# Patient Record
Sex: MALE | Race: WHITE | NOT HISPANIC OR LATINO | Employment: FULL TIME | URBAN - METROPOLITAN AREA
[De-identification: names, ages, dates, MRNs, and addresses within clinical notes are randomized per-mention and may not be internally consistent; named-entity substitution may affect disease eponyms.]

---

## 2022-07-19 ENCOUNTER — ANCILLARY PROCEDURE (OUTPATIENT)
Dept: GENERAL RADIOLOGY | Facility: CLINIC | Age: 27
End: 2022-07-19
Attending: PHYSICIAN ASSISTANT
Payer: COMMERCIAL

## 2022-07-19 ENCOUNTER — OFFICE VISIT (OUTPATIENT)
Dept: FAMILY MEDICINE | Facility: CLINIC | Age: 27
End: 2022-07-19
Payer: COMMERCIAL

## 2022-07-19 VITALS
SYSTOLIC BLOOD PRESSURE: 118 MMHG | OXYGEN SATURATION: 99 % | TEMPERATURE: 97.6 F | DIASTOLIC BLOOD PRESSURE: 65 MMHG | HEART RATE: 75 BPM | RESPIRATION RATE: 16 BRPM | WEIGHT: 152.31 LBS

## 2022-07-19 DIAGNOSIS — R06.02 SHORTNESS OF BREATH: ICD-10-CM

## 2022-07-19 DIAGNOSIS — F41.9 ANXIETY: Primary | ICD-10-CM

## 2022-07-19 LAB
ALBUMIN UR-MCNC: NEGATIVE MG/DL
APPEARANCE UR: CLEAR
BASOPHILS # BLD AUTO: 0 10E3/UL (ref 0–0.2)
BASOPHILS NFR BLD AUTO: 0 %
BILIRUB UR QL STRIP: NEGATIVE
COLOR UR AUTO: YELLOW
EOSINOPHIL # BLD AUTO: 0 10E3/UL (ref 0–0.7)
EOSINOPHIL NFR BLD AUTO: 0 %
ERYTHROCYTE [DISTWIDTH] IN BLOOD BY AUTOMATED COUNT: 11.9 % (ref 10–15)
GLUCOSE UR STRIP-MCNC: NEGATIVE MG/DL
HCT VFR BLD AUTO: 43.1 % (ref 40–53)
HGB BLD-MCNC: 14.9 G/DL (ref 13.3–17.7)
HGB UR QL STRIP: NEGATIVE
IMM GRANULOCYTES # BLD: 0 10E3/UL
IMM GRANULOCYTES NFR BLD: 0 %
KETONES UR STRIP-MCNC: ABNORMAL MG/DL
LEUKOCYTE ESTERASE UR QL STRIP: NEGATIVE
LYMPHOCYTES # BLD AUTO: 1.1 10E3/UL (ref 0.8–5.3)
LYMPHOCYTES NFR BLD AUTO: 11 %
MCH RBC QN AUTO: 29.4 PG (ref 26.5–33)
MCHC RBC AUTO-ENTMCNC: 34.6 G/DL (ref 31.5–36.5)
MCV RBC AUTO: 85 FL (ref 78–100)
MONOCYTES # BLD AUTO: 0.8 10E3/UL (ref 0–1.3)
MONOCYTES NFR BLD AUTO: 8 %
NEUTROPHILS # BLD AUTO: 8.8 10E3/UL (ref 1.6–8.3)
NEUTROPHILS NFR BLD AUTO: 82 %
NITRATE UR QL: NEGATIVE
PH UR STRIP: 8.5 [PH] (ref 5–8)
PLATELET # BLD AUTO: 267 10E3/UL (ref 150–450)
RBC # BLD AUTO: 5.06 10E6/UL (ref 4.4–5.9)
SP GR UR STRIP: 1.01 (ref 1–1.03)
UROBILINOGEN UR STRIP-ACNC: 0.2 E.U./DL
WBC # BLD AUTO: 10.8 10E3/UL (ref 4–11)

## 2022-07-19 PROCEDURE — 36415 COLL VENOUS BLD VENIPUNCTURE: CPT | Performed by: PHYSICIAN ASSISTANT

## 2022-07-19 PROCEDURE — 85025 COMPLETE CBC W/AUTO DIFF WBC: CPT | Performed by: PHYSICIAN ASSISTANT

## 2022-07-19 PROCEDURE — 81003 URINALYSIS AUTO W/O SCOPE: CPT | Performed by: PHYSICIAN ASSISTANT

## 2022-07-19 PROCEDURE — 71046 X-RAY EXAM CHEST 2 VIEWS: CPT | Mod: TC | Performed by: RADIOLOGY

## 2022-07-19 PROCEDURE — 99203 OFFICE O/P NEW LOW 30 MIN: CPT | Performed by: PHYSICIAN ASSISTANT

## 2022-07-19 RX ORDER — HYDROXYZINE PAMOATE 50 MG/1
100 CAPSULE ORAL
Qty: 28 CAPSULE | Refills: 0 | Status: SHIPPED | OUTPATIENT
Start: 2022-07-19 | End: 2022-08-02

## 2022-07-20 NOTE — PROGRESS NOTES
Patient presents with:  Shortness of Breath: Has had SOB for several days  has tightening in abd and tingling in shoulders tested negative for COVID today      Clinical Decision Making:  Advised patient that we did do a work-up looking for CBC for anemia, checking his urine and also doing a chest x-ray.  The examination and laboratory findings were within normal limits and had benign exam.  Patient is reassured.  He may follow-up with his primary care provider for further work-up reviewing TSH or further causes.  TSH was not screened today.  Patient be given Vistaril to help with sleep at nighttime.  He is cautioned regarding impairment and sedation and not to use during the daytime or when he may be impaired and hurt himself or someone else.  Questions were answered to his satisfaction before discharge          ICD-10-CM    1. Anxiety  F41.9 hydrOXYzine (VISTARIL) 50 MG capsule   2. Shortness of breath  R06.02 CBC with platelets and differential     UA macro with reflex to Microscopic and Culture - Clinc Collect     XR Chest 2 Views       Patient Instructions   Take the medication at nighttime to help with sleep and your symptoms.  Do not use during the daytime when he have to drive or do other attentional activities where you may harm yourself or someone else.    Follow-up with your primary care provider for reevaluation and treatment of your symptoms.            HPI:  Ren Prieto is a 26 year old male who presents today shortness of breath for several days and has tightening in the abdomen and tingling in her shoulders.  Patient tested at home for COVID and was negative.  He believes the shortness of breath has been intermittent over the last 1 week.  He does not recall anything that makes it better or worse.  Patient does not have respiratory symptoms specifically to include cough nausea vomiting diarrhea skin rash abdominal pain urinary symptoms heart palpitations lightheadedness syncope or presyncope.   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  Patient shares that he has been trying to hydrate and had 420 ounce bottles of water today which would be at least 80 ounces total intake and his urine has been clear.     History obtained from chart review and the patient.    Problem List:  There are no relevant problems documented for this patient.      No past medical history on file.    Social History     Tobacco Use     Smoking status: Never Smoker     Smokeless tobacco: Never Used   Substance Use Topics     Alcohol use: Not on file       Review of Systems  As above in HPI otherwise negative.    Vitals:    07/19/22 1928   BP: 118/65   Pulse: 75   Resp: 16   Temp: 97.6  F (36.4  C)   TempSrc: Oral   SpO2: 99%   Weight: 69.1 kg (152 lb 5 oz)       General: Patient is resting comfortably no acute distress is afebrile  HEENT: Head is normocephalic atraumatic   eyes are PERRL EOMI sclera anicteric   TMs are clear bilaterally  Throat is clear and oral mucous membranes are  No cervical lymphadenopathy present  LUNGS: Clear to auscultation bilaterally  HEART: Regular rate and rhythm  Abdomen: Nontender nondistended no rebound or guarding no masses  Skin: Without rash non-diaphoretic capillary refills less than 2 seconds.  Neuro: Radial nerves II through XII grossly intact no focal neurologic deficits.  Finger-to-nose heel-to-shin testing and strength is 5 out of 5 and equal bilaterally.  Romberg is negative    Physical Exam      Labs:  Results for orders placed or performed in visit on 07/19/22   XR Chest 2 Views     Status: None    Narrative    EXAM: XR CHEST 2 VW  LOCATION: [facility]  DATE/TIME: 7/19/2022 7:54 PM    INDICATION: Shortness of breath.  COMPARISON: None.      Impression    IMPRESSION: Negative chest. Lungs clear.   UA macro with reflex to Microscopic and Culture - Clinc Collect     Status: Abnormal    Specimen: Urine, Clean Catch    Result Value Ref Range    Color Urine Yellow Colorless, Straw, Light Yellow, Yellow    Appearance Urine Clear Clear    Glucose Urine Negative Negative mg/dL    Bilirubin Urine Negative Negative    Ketones Urine Trace (A) Negative mg/dL    Specific Gravity Urine 1.015 1.005 - 1.030    Blood Urine Negative Negative    pH Urine 8.5 (H) 5.0 - 8.0    Protein Albumin Urine Negative Negative mg/dL    Urobilinogen Urine 0.2 0.2, 1.0 E.U./dL    Nitrite Urine Negative Negative    Leukocyte Esterase Urine Negative Negative    Narrative    Microscopic not indicated   CBC with platelets and differential     Status: Abnormal   Result Value Ref Range    WBC Count 10.8 4.0 - 11.0 10e3/uL    RBC Count 5.06 4.40 - 5.90 10e6/uL    Hemoglobin 14.9 13.3 - 17.7 g/dL    Hematocrit 43.1 40.0 - 53.0 %    MCV 85 78 - 100 fL    MCH 29.4 26.5 - 33.0 pg    MCHC 34.6 31.5 - 36.5 g/dL    RDW 11.9 10.0 - 15.0 %    Platelet Count 267 150 - 450 10e3/uL    % Neutrophils 82 %    % Lymphocytes 11 %    % Monocytes 8 %    % Eosinophils 0 %    % Basophils 0 %    % Immature Granulocytes 0 %    Absolute Neutrophils 8.8 (H) 1.6 - 8.3 10e3/uL    Absolute Lymphocytes 1.1 0.8 - 5.3 10e3/uL    Absolute Monocytes 0.8 0.0 - 1.3 10e3/uL    Absolute Eosinophils 0.0 0.0 - 0.7 10e3/uL    Absolute Basophils 0.0 0.0 - 0.2 10e3/uL    Absolute Immature Granulocytes 0.0 <=0.4 10e3/uL   CBC with platelets and differential     Status: Abnormal    Narrative    The following orders were created for panel order CBC with platelets and differential.  Procedure                               Abnormality         Status                     ---------                               -----------         ------                     CBC with platelets and d...[[id number]]  Abnormal            Final result                 Please view results for these tests on the individual orders.     At the end of the encounter, I discussed results, diagnosis, medications. Discussed red flags for  immediate return to clinic/ER, as well as indications for follow up if no improvement. Patient understood and agreed to plan. Patient was stable for discharge.

## 2022-07-20 NOTE — PATIENT INSTRUCTIONS
Take the medication at nighttime to help with sleep and your symptoms.  Do not use during the daytime when he have to drive or do other attentional activities where you may harm yourself or someone else.    Follow-up with your primary care provider for reevaluation and treatment of your symptoms.

## 2022-08-02 ENCOUNTER — OFFICE VISIT (OUTPATIENT)
Dept: INTERNAL MEDICINE | Facility: CLINIC | Age: 27
End: 2022-08-02

## 2022-08-02 VITALS
OXYGEN SATURATION: 99 % | HEART RATE: 85 BPM | BODY MASS INDEX: 20.67 KG/M2 | DIASTOLIC BLOOD PRESSURE: 64 MMHG | SYSTOLIC BLOOD PRESSURE: 122 MMHG | WEIGHT: 156 LBS | HEIGHT: 73 IN

## 2022-08-02 DIAGNOSIS — F41.9 ANXIETY: ICD-10-CM

## 2022-08-02 PROCEDURE — 99213 OFFICE O/P EST LOW 20 MIN: CPT | Performed by: NURSE PRACTITIONER

## 2022-08-02 PROCEDURE — 96127 BRIEF EMOTIONAL/BEHAV ASSMT: CPT | Performed by: NURSE PRACTITIONER

## 2022-08-02 RX ORDER — HYDROXYZINE PAMOATE 50 MG/1
100 CAPSULE ORAL
Qty: 60 CAPSULE | Refills: 2 | Status: SHIPPED | OUTPATIENT
Start: 2022-08-02 | End: 2024-02-27

## 2022-08-02 ASSESSMENT — PATIENT HEALTH QUESTIONNAIRE - PHQ9
SUM OF ALL RESPONSES TO PHQ QUESTIONS 1-9: 8
5. POOR APPETITE OR OVEREATING: MORE THAN HALF THE DAYS

## 2022-08-02 ASSESSMENT — ANXIETY QUESTIONNAIRES
GAD7 TOTAL SCORE: 10
8. IF YOU CHECKED OFF ANY PROBLEMS, HOW DIFFICULT HAVE THESE MADE IT FOR YOU TO DO YOUR WORK, TAKE CARE OF THINGS AT HOME, OR GET ALONG WITH OTHER PEOPLE?: SOMEWHAT DIFFICULT
2. NOT BEING ABLE TO STOP OR CONTROL WORRYING: SEVERAL DAYS
3. WORRYING TOO MUCH ABOUT DIFFERENT THINGS: MORE THAN HALF THE DAYS
6. BECOMING EASILY ANNOYED OR IRRITABLE: NEARLY EVERY DAY
GAD7 TOTAL SCORE: 11
5. BEING SO RESTLESS THAT IT IS HARD TO SIT STILL: SEVERAL DAYS
2. NOT BEING ABLE TO STOP OR CONTROL WORRYING: SEVERAL DAYS
5. BEING SO RESTLESS THAT IT IS HARD TO SIT STILL: SEVERAL DAYS
GAD7 TOTAL SCORE: 10
IF YOU CHECKED OFF ANY PROBLEMS ON THIS QUESTIONNAIRE, HOW DIFFICULT HAVE THESE PROBLEMS MADE IT FOR YOU TO DO YOUR WORK, TAKE CARE OF THINGS AT HOME, OR GET ALONG WITH OTHER PEOPLE: SOMEWHAT DIFFICULT
1. FEELING NERVOUS, ANXIOUS, OR ON EDGE: SEVERAL DAYS
GAD7 TOTAL SCORE: 10
7. FEELING AFRAID AS IF SOMETHING AWFUL MIGHT HAPPEN: SEVERAL DAYS
3. WORRYING TOO MUCH ABOUT DIFFERENT THINGS: SEVERAL DAYS
7. FEELING AFRAID AS IF SOMETHING AWFUL MIGHT HAPPEN: SEVERAL DAYS
IF YOU CHECKED OFF ANY PROBLEMS ON THIS QUESTIONNAIRE, HOW DIFFICULT HAVE THESE PROBLEMS MADE IT FOR YOU TO DO YOUR WORK, TAKE CARE OF THINGS AT HOME, OR GET ALONG WITH OTHER PEOPLE: SOMEWHAT DIFFICULT
7. FEELING AFRAID AS IF SOMETHING AWFUL MIGHT HAPPEN: SEVERAL DAYS
6. BECOMING EASILY ANNOYED OR IRRITABLE: NEARLY EVERY DAY
4. TROUBLE RELAXING: MORE THAN HALF THE DAYS
1. FEELING NERVOUS, ANXIOUS, OR ON EDGE: SEVERAL DAYS

## 2022-08-02 ASSESSMENT — ENCOUNTER SYMPTOMS: NERVOUS/ANXIOUS: 1

## 2022-08-02 NOTE — PROGRESS NOTES
"  Assessment & Plan     Anxiety  Seeing great results from the hydroxyzine every evening.  Refills provided today.  He is going to stop by the  and schedule a \"establish care\" appointment with new PCP before he leaves today    - hydrOXYzine (VISTARIL) 50 MG capsule; Take 2 capsules (100 mg) by mouth nightly as needed for itching or anxiety      Heriberto Ochoa, [facility]    Michael Mcclure is a 26 year old, presenting for the following health issues:  Anxiety      Anxiety    History of Present Illness       Mental Health Follow-up:  Patient presents to follow-up on Anxiety.    Patient's anxiety since last visit has been:  Better  The patient is having other symptoms associated with anxiety.  Any significant life events: job concerns  Patient is feeling anxious or having panic attacks.  Patient has no concerns about alcohol or drug use.    He eats 0-1 servings of fruits and vegetables daily.He consumes 2 sweetened beverage(s) daily.He exercises with enough effort to increase his heart rate 30 to 60 minutes per day.  He exercises with enough effort to increase his heart rate 4 days per week.   He is taking medications regularly.  Today's ANGELICA-7 Score: 10       Review of Systems   Psychiatric/Behavioral: The patient is nervous/anxious.       Constitutional, HEENT, cardiovascular, pulmonary, gi and gu systems are negative, except as otherwise noted.      Objective    /64 (BP Location: Right arm, Patient Position: Sitting, Cuff Size: Adult Regular)   Pulse 85   Ht 1.854 m (6' 1\")   Wt 70.8 kg (156 lb)   SpO2 99%   BMI 20.58 kg/m    Body mass index is 20.58 kg/m .  Physical Exam     Healthy appearing and in no acute distress      .  ..  "

## 2022-09-23 ENCOUNTER — OFFICE VISIT (OUTPATIENT)
Dept: FAMILY MEDICINE | Facility: CLINIC | Age: 27
End: 2022-09-23
Payer: COMMERCIAL

## 2022-09-23 VITALS
DIASTOLIC BLOOD PRESSURE: 62 MMHG | SYSTOLIC BLOOD PRESSURE: 120 MMHG | HEIGHT: 74 IN | WEIGHT: 185.6 LBS | BODY MASS INDEX: 23.82 KG/M2 | HEART RATE: 84 BPM

## 2022-09-23 DIAGNOSIS — F41.9 ANXIETY: Primary | ICD-10-CM

## 2022-09-23 DIAGNOSIS — G40.909 SEIZURE DISORDER (H): ICD-10-CM

## 2022-09-23 PROCEDURE — 99214 OFFICE O/P EST MOD 30 MIN: CPT | Mod: 25 | Performed by: NURSE PRACTITIONER

## 2022-09-23 PROCEDURE — 96127 BRIEF EMOTIONAL/BEHAV ASSMT: CPT | Performed by: NURSE PRACTITIONER

## 2022-09-23 PROCEDURE — 90471 IMMUNIZATION ADMIN: CPT | Performed by: NURSE PRACTITIONER

## 2022-09-23 PROCEDURE — 90715 TDAP VACCINE 7 YRS/> IM: CPT | Performed by: NURSE PRACTITIONER

## 2022-09-23 RX ORDER — BUSPIRONE HYDROCHLORIDE 7.5 MG/1
7.5 TABLET ORAL 3 TIMES DAILY
Qty: 60 TABLET | Refills: 1 | Status: SHIPPED | OUTPATIENT
Start: 2022-09-23 | End: 2023-12-11

## 2022-09-23 RX ORDER — VENLAFAXINE HYDROCHLORIDE 37.5 MG/1
37.5 CAPSULE, EXTENDED RELEASE ORAL DAILY
Qty: 30 CAPSULE | Refills: 1 | Status: SHIPPED | OUTPATIENT
Start: 2022-09-23 | End: 2022-09-23

## 2022-09-23 ASSESSMENT — ANXIETY QUESTIONNAIRES
7. FEELING AFRAID AS IF SOMETHING AWFUL MIGHT HAPPEN: NOT AT ALL
4. TROUBLE RELAXING: NOT AT ALL
GAD7 TOTAL SCORE: 3
3. WORRYING TOO MUCH ABOUT DIFFERENT THINGS: SEVERAL DAYS
GAD7 TOTAL SCORE: 3
8. IF YOU CHECKED OFF ANY PROBLEMS, HOW DIFFICULT HAVE THESE MADE IT FOR YOU TO DO YOUR WORK, TAKE CARE OF THINGS AT HOME, OR GET ALONG WITH OTHER PEOPLE?: NOT DIFFICULT AT ALL
5. BEING SO RESTLESS THAT IT IS HARD TO SIT STILL: NOT AT ALL
1. FEELING NERVOUS, ANXIOUS, OR ON EDGE: SEVERAL DAYS
GAD7 TOTAL SCORE: 3
6. BECOMING EASILY ANNOYED OR IRRITABLE: SEVERAL DAYS
7. FEELING AFRAID AS IF SOMETHING AWFUL MIGHT HAPPEN: NOT AT ALL
IF YOU CHECKED OFF ANY PROBLEMS ON THIS QUESTIONNAIRE, HOW DIFFICULT HAVE THESE PROBLEMS MADE IT FOR YOU TO DO YOUR WORK, TAKE CARE OF THINGS AT HOME, OR GET ALONG WITH OTHER PEOPLE: NOT DIFFICULT AT ALL
2. NOT BEING ABLE TO STOP OR CONTROL WORRYING: NOT AT ALL

## 2022-09-23 ASSESSMENT — ENCOUNTER SYMPTOMS
NERVOUS/ANXIOUS: 1
SLEEP DISTURBANCE: 0

## 2022-09-23 NOTE — PROGRESS NOTES
Assessment & Plan     Seizure disorder  - in past, last sx age 18.     Anxiety  May try Buspar. Side effects and how to take the med discussed.   - busPIRone (BUSPAR) 7.5 MG tablet; Take 1 tablet (7.5 mg) by mouth 3 times daily       MEDICATIONS:        - Trial of Buspar.        - Take Atarax only PRN and try to only take 1/2-1 tablet per episode.    Return in about 2 months (around 11/23/2022) for Follow up, with me, using a video visit.    CHAPIN Riojas [facility]    Michael Mcclure is a 27 year old presenting for the following health issues:  Establish Care    -The patient is present today to establish care.  He is present with his girlfriend.  He stated that his anxiety is slightly better and his sleep has been better with taking the hydroxyzine at night.  He stated that he has a lot of stress from work.  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  He tends to work 12-hour shifts, about 60 hours/week.  His girlfriend is noticing more irritability at times during the day.  The patient denied any depression or suicidal thoughts.  He is not having any more panic attacks.  He stated prior to taking hydroxyzine he would noticed that he would toss and turn at night, but would get an average of 6 to 8 hours of sleep.     History of Present Illness       Mental Health Follow-up:  Patient presents to follow-up on Anxiety.    Patient's anxiety since last visit has been:  Better  The patient is not having other symptoms associated with anxiety.  Any significant life events: No  Patient is not feeling anxious or having panic attacks.  Patient has no concerns about alcohol or drug use.    He eats 0-1 servings of fruits and vegetables daily.He consumes 2 sweetened beverage(s) daily.He exercises with enough effort to increase his heart rate 30 to 60 minutes per day.  He exercises with enough effort to increase his heart rate 3 or less days per week. He is missing 1  "dose(s) of medications per week.  Today's ANGELICA-7 Score: 3             Review of Systems   Psychiatric/Behavioral: Negative for self-injury, sleep disturbance and suicidal ideas. The patient is nervous/anxious.             Objective    /62 (BP Location: Right arm, Patient Position: Sitting, Cuff Size: Adult Regular)   Pulse 84   Ht 1.867 m (6' 1.5\")   Wt 84.2 kg (185 lb 9.6 oz)   BMI 24.15 kg/m    Body mass index is 24.15 kg/m .  Physical Exam  Vitals and nursing note reviewed.   Constitutional:       Appearance: Normal appearance. He is normal weight.   Pulmonary:      Effort: Pulmonary effort is normal.   Neurological:      Mental Status: He is alert.   Psychiatric:         Mood and Affect: Mood normal.         Behavior: Behavior normal.         Thought Content: Thought content normal.         Judgment: Judgment normal.                            "

## 2023-12-01 ENCOUNTER — TRANSFERRED RECORDS (OUTPATIENT)
Dept: HEALTH INFORMATION MANAGEMENT | Facility: CLINIC | Age: 28
End: 2023-12-01
Payer: COMMERCIAL

## 2023-12-07 ENCOUNTER — TRANSCRIBE ORDERS (OUTPATIENT)
Dept: OTHER | Age: 28
End: 2023-12-07

## 2023-12-07 DIAGNOSIS — R06.2 WHEEZING: ICD-10-CM

## 2023-12-07 DIAGNOSIS — R13.12 OROPHARYNGEAL DYSPHAGIA: ICD-10-CM

## 2023-12-07 DIAGNOSIS — R13.19 ESOPHAGEAL DYSPHAGIA: Primary | ICD-10-CM

## 2023-12-11 ENCOUNTER — VIRTUAL VISIT (OUTPATIENT)
Dept: FAMILY MEDICINE | Facility: CLINIC | Age: 28
End: 2023-12-11
Payer: COMMERCIAL

## 2023-12-11 DIAGNOSIS — F41.9 ANXIETY: ICD-10-CM

## 2023-12-11 PROCEDURE — 99213 OFFICE O/P EST LOW 20 MIN: CPT | Mod: VID | Performed by: NURSE PRACTITIONER

## 2023-12-11 RX ORDER — OMEPRAZOLE 40 MG/1
40 CAPSULE, DELAYED RELEASE ORAL DAILY
COMMUNITY
Start: 2023-10-10 | End: 2023-12-26

## 2023-12-11 RX ORDER — FAMOTIDINE 20 MG/1
20 TABLET, FILM COATED ORAL
COMMUNITY
Start: 2023-10-04 | End: 2024-02-27

## 2023-12-11 RX ORDER — BUSPIRONE HYDROCHLORIDE 7.5 MG/1
7.5 TABLET ORAL 2 TIMES DAILY
Qty: 60 TABLET | Refills: 11 | Status: SHIPPED | OUTPATIENT
Start: 2023-12-11

## 2023-12-11 NOTE — PROGRESS NOTES
Ren is a 28 year old who is being evaluated via a billable video visit.      How would you like to obtain your AVS? MyChart  If the video visit is dropped, the invitation should be resent by: Text to cell phone: [phone]  Will anyone else be joining your video visit? No          Assessment & Plan     Anxiety    - busPIRone (BUSPAR) 7.5 MG tablet  Dispense: 60 tablet; Refill: 11    -His anxiety is stable with BuSpar.  He can continue this medication.  He does not need a refill of his hydroxyzine.   -He will continue to follow-up with specialist as needed.                  CHAPIN Riojas [facility]   Ren is a 28 year old, presenting for the following health issues:  Recheck Medication         No data to display              -He has been seen by a GI specialist.  He has plans to get a CT scan of the lungs tomorrow because he is noticing a wheeze, and will follow-up with pulmonology after that.  His anxiety has been for the most part under control, but has started using his BuSpar more often since he has been noticing other symptoms with his stomach and wheezing.  He finds that the BuSpar is effective, and he takes it twice a day.  He denied any suicidal or homicidal thoughts or plans.  He still works at the same job, but has been getting more weekends off, and finding more stable staff.  This has helped him with the stress level.  He denied any recent panic attacks or hospitalizations for panic.     HPI             Review of Systems         Objective           Vitals:  No vitals were obtained today due to virtual visit.    Physical Exam   GENERAL: Healthy, alert and no distress  EYES: Eyes grossly normal to inspection.  No discharge or erythema, or obvious scleral/conjunctival abnormalities.  RESP: No audible wheeze, cough, or visible cyanosis.  No visible retractions or increased work of breathing.    SKIN: Visible skin clear. No significant  rash, abnormal pigmentation or lesions.  NEURO: Cranial nerves grossly intact.  Mentation and speech appropriate for age.  PSYCH: Mentation appears normal, affect normal/bright, judgement and insight intact, normal speech and appearance well-groomed.    Office Visit on 07/19/2022   Component Date Value Ref Range Status    Color Urine 07/19/2022 Yellow  Colorless, Straw, Light Yellow, Yellow Final    Appearance Urine 07/19/2022 Clear  Clear Final    Glucose Urine 07/19/2022 Negative  Negative mg/dL Final    Bilirubin Urine 07/19/2022 Negative  Negative Final    Ketones Urine 07/19/2022 Trace (A)  Negative mg/dL Final    Specific Gravity Urine 07/19/2022 1.015  1.005 - 1.030 Final    Blood Urine 07/19/2022 Negative  Negative Final    pH Urine 07/19/2022 8.5 (H)  5.0 - 8.0 Final    Protein Albumin Urine 07/19/2022 Negative  Negative mg/dL Final    Urobilinogen Urine 07/19/2022 0.2  0.2, 1.0 E.U./dL Final    Nitrite Urine 07/19/2022 Negative  Negative Final    Leukocyte Esterase Urine 07/19/2022 Negative  Negative Final    WBC Count 07/19/2022 10.8  4.0 - 11.0 10e3/uL Final    RBC Count 07/19/2022 5.06  4.40 - 5.90 10e6/uL Final    Hemoglobin 07/19/2022 14.9  13.3 - 17.7 g/dL Final    Hematocrit 07/19/2022 43.1  40.0 - 53.0 % Final    MCV 07/19/2022 85  78 - 100 fL Final    MCH 07/19/2022 29.4  26.5 - 33.0 pg Final    MCHC 07/19/2022 34.6  31.5 - 36.5 g/dL Final    RDW 07/19/2022 11.9  10.0 - 15.0 % Final    Platelet Count 07/19/2022 267  150 - 450 10e3/uL Final    % Neutrophils 07/19/2022 82  % Final    % Lymphocytes 07/19/2022 11  % Final    % Monocytes 07/19/2022 8  % Final    % Eosinophils 07/19/2022 0  % Final    % Basophils 07/19/2022 0  % Final    % Immature Granulocytes 07/19/2022 0  % Final    Absolute Neutrophils 07/19/2022 8.8 (H)  1.6 - 8.3 10e3/uL Final    Absolute Lymphocytes 07/19/2022 1.1  0.8 - 5.3 10e3/uL Final    Absolute Monocytes 07/19/2022 0.8  0.0 - 1.3 10e3/uL Final    Absolute Eosinophils  07/19/2022 0.0  0.0 - 0.7 10e3/uL Final    Absolute Basophils 07/19/2022 0.0  0.0 - 0.2 10e3/uL Final    Absolute Immature Granulocytes 07/19/2022 0.0  <=0.4 10e3/uL Final               Video-Visit Details    Type of service:  Video Visit     Originating Location (pt. Location): Home    Distant Location (provider location):  On-site  Platform used for Video Visit: Torri

## 2023-12-12 ENCOUNTER — TRANSFERRED RECORDS (OUTPATIENT)
Dept: HEALTH INFORMATION MANAGEMENT | Facility: CLINIC | Age: 28
End: 2023-12-12
Payer: COMMERCIAL

## 2023-12-12 DIAGNOSIS — R06.2 WHEEZING: Primary | ICD-10-CM

## 2023-12-14 ENCOUNTER — TRANSFERRED RECORDS (OUTPATIENT)
Dept: HEALTH INFORMATION MANAGEMENT | Facility: CLINIC | Age: 28
End: 2023-12-14
Payer: COMMERCIAL

## 2023-12-21 ENCOUNTER — OFFICE VISIT (OUTPATIENT)
Dept: PULMONOLOGY | Facility: CLINIC | Age: 28
End: 2023-12-21
Payer: COMMERCIAL

## 2023-12-21 DIAGNOSIS — R06.2 WHEEZING: ICD-10-CM

## 2023-12-21 LAB — HGB BLD-MCNC: 15.2 G/DL

## 2023-12-21 PROCEDURE — 94060 EVALUATION OF WHEEZING: CPT | Performed by: NURSE PRACTITIONER

## 2023-12-21 PROCEDURE — 94726 PLETHYSMOGRAPHY LUNG VOLUMES: CPT | Performed by: NURSE PRACTITIONER

## 2023-12-21 PROCEDURE — 85018 HEMOGLOBIN: CPT | Mod: QW | Performed by: NURSE PRACTITIONER

## 2023-12-21 PROCEDURE — 94729 DIFFUSING CAPACITY: CPT | Performed by: NURSE PRACTITIONER

## 2023-12-25 ENCOUNTER — MYC MEDICAL ADVICE (OUTPATIENT)
Dept: FAMILY MEDICINE | Facility: CLINIC | Age: 28
End: 2023-12-25
Payer: COMMERCIAL

## 2023-12-25 DIAGNOSIS — R06.02 SHORTNESS OF BREATH: Primary | ICD-10-CM

## 2023-12-26 RX ORDER — MAGNESIUM HYDROXIDE/ALUMINUM HYDROXICE/SIMETHICONE 120; 1200; 1200 MG/30ML; MG/30ML; MG/30ML
15-30 SUSPENSION ORAL
COMMUNITY
Start: 2023-10-03 | End: 2024-04-05

## 2023-12-26 RX ORDER — OMEPRAZOLE 40 MG/1
40 CAPSULE, DELAYED RELEASE ORAL DAILY
Qty: 90 CAPSULE | Refills: 0 | Status: SHIPPED | OUTPATIENT
Start: 2023-12-26

## 2023-12-28 LAB
DLCOCOR-%PRED-PRE: 104 %
DLCOCOR-PRE: 37.44 ML/MIN/MMHG
DLCOUNC-%PRED-PRE: 106 %
DLCOUNC-PRE: 38.06 ML/MIN/MMHG
DLCOUNC-PRED: 35.78 ML/MIN/MMHG
ERV-%PRED-PRE: 83 %
ERV-PRE: 1.65 L
ERV-PRED: 1.97 L
EXPTIME-PRE: 4.52 SEC
FEF2575-%PRED-POST: 66 %
FEF2575-%PRED-PRE: 52 %
FEF2575-POST: 3.2 L/SEC
FEF2575-PRE: 2.53 L/SEC
FEF2575-PRED: 4.8 L/SEC
FEFMAX-%PRED-PRE: 58 %
FEFMAX-PRE: 6.33 L/SEC
FEFMAX-PRED: 10.79 L/SEC
FEV1-%PRED-PRE: 81 %
FEV1-PRE: 3.75 L
FEV1FEV6-PRE: 61 %
FEV1FEV6-PRED: 84 %
FEV1FVC-PRE: 61 %
FEV1FVC-PRED: 84 %
FEV1SVC-PRE: 66 %
FEV1SVC-PRED: 81 %
FIFMAX-PRE: 5.97 L/SEC
FRCPLETH-%PRED-PRE: 130 %
FRCPLETH-PRE: 4.52 L
FRCPLETH-PRED: 3.46 L
FVC-%PRED-PRE: 111 %
FVC-PRE: 6.15 L
FVC-PRED: 5.53 L
IC-%PRED-PRE: 102 %
IC-PRE: 4.04 L
IC-PRED: 3.93 L
RVPLETH-%PRED-PRE: 163 %
RVPLETH-PRE: 2.88 L
RVPLETH-PRED: 1.76 L
TLCPLETH-%PRED-PRE: 112 %
TLCPLETH-PRE: 8.57 L
TLCPLETH-PRED: 7.63 L
VA-%PRED-PRE: 111 %
VA-PRE: 7.95 L
VC-%PRED-PRE: 99 %
VC-PRE: 5.69 L
VC-PRED: 5.69 L

## 2023-12-31 ENCOUNTER — HEALTH MAINTENANCE LETTER (OUTPATIENT)
Age: 28
End: 2023-12-31

## 2024-01-14 ENCOUNTER — MYC MEDICAL ADVICE (OUTPATIENT)
Dept: FAMILY MEDICINE | Facility: CLINIC | Age: 29
End: 2024-01-14
Payer: COMMERCIAL

## 2024-01-19 ENCOUNTER — OFFICE VISIT (OUTPATIENT)
Dept: PULMONOLOGY | Facility: CLINIC | Age: 29
End: 2024-01-19
Attending: INTERNAL MEDICINE
Payer: COMMERCIAL

## 2024-01-19 VITALS
WEIGHT: 151.8 LBS | OXYGEN SATURATION: 100 % | DIASTOLIC BLOOD PRESSURE: 72 MMHG | BODY MASS INDEX: 19.76 KG/M2 | HEART RATE: 66 BPM | SYSTOLIC BLOOD PRESSURE: 129 MMHG

## 2024-01-19 DIAGNOSIS — R13.12 OROPHARYNGEAL DYSPHAGIA: ICD-10-CM

## 2024-01-19 DIAGNOSIS — R13.19 ESOPHAGEAL DYSPHAGIA: ICD-10-CM

## 2024-01-19 DIAGNOSIS — R06.2 WHEEZING: ICD-10-CM

## 2024-01-19 DIAGNOSIS — J45.909 REACTIVE AIRWAY DISEASE WITHOUT COMPLICATION, UNSPECIFIED ASTHMA SEVERITY, UNSPECIFIED WHETHER PERSISTENT: Primary | ICD-10-CM

## 2024-01-19 PROCEDURE — 90686 IIV4 VACC NO PRSV 0.5 ML IM: CPT | Performed by: INTERNAL MEDICINE

## 2024-01-19 PROCEDURE — 90471 IMMUNIZATION ADMIN: CPT | Performed by: INTERNAL MEDICINE

## 2024-01-19 PROCEDURE — 99204 OFFICE O/P NEW MOD 45 MIN: CPT | Mod: 25 | Performed by: INTERNAL MEDICINE

## 2024-01-19 RX ORDER — ALBUTEROL SULFATE 90 UG/1
2 AEROSOL, METERED RESPIRATORY (INHALATION) EVERY 6 HOURS PRN
Qty: 18 G | Refills: 11 | Status: SHIPPED | OUTPATIENT
Start: 2024-01-19

## 2024-01-19 NOTE — PROGRESS NOTES
Pulmonary Clinic Outpatient Consultation    Assessment and Plan:   28M with a history of dysphagia, anxiety, presents for wheezing evaluation. He's had intermittent wheezing associated with occasional cough and shortness of breath for several months. The symptoms are relieved by albuterol use. PFTs showed borderline FEV1 with positive response to bronchodilator, reduction in mid flow rates with positive bronchodilator response, normal TLC and normal Dlco. These are consistent with small airways disease and probable asthma. His seems to be mild with only as needed albuterol use. Discussed pathophysiology of asthma and treatment options. He does not feel he needs a maintenance inhaler. I will prescribe him the albuterol to be uses as needed. Discussed importance of vaccinations.     Recommendations:  - albuterol inhaler with spacer, 1-2 puffs up to 4x per day as needed  - if he is using his rescue inhaler very frequently, advised him to let me know so we could discuss getting him on a maintenance inhaler  - flu shot today. Should get pneumococcal and covid-19 vaccination; defer to his PMD.    Offered scheduled follow up in 6-12 months, but he prefers to follow up with his PMD going forward. I encouraged him to call me with any questions or concerns.    Yaron (Khurram Wells MD  [facility] ([facility])  Clinic [phone]  Fax [phone]     CCx: dysphagia, wheezing    HPI: 28M with a history of dysphagia, anxiety, presents for wheezing evaluation. He was referred by SUNNY MITCHELL. He was noted to have wheezing during a recent GI office visit. He doesn't smoke, but he has been vaping for 4 year. He quit vaping for one month and did not notice any change in his wheezing. He notes issues sleeping. He hs some dyspnea but nothing too limiting. He has no hemoptysis of sputum production. He uses his girlfriend's inhaler with good relief.   He notes that when he was a toddler he required  inhalers and nebulized medications but had not needed to use them for many years.     ROS:  A 12-system review was obtained and was negative with the exception of the symptoms endorsed in the history of present illness.    PMH:  No past medical history on file.     PSH:  No past surgical history on file.    Allergies:  No Known Allergies    Family HX:  No family history on file.    Social Hx:  Social History     Socioeconomic History    Marital status: Single     Spouse name: Not on file    Number of children: Not on file    Years of education: Not on file    Highest education level: Not on file   Occupational History    Not on file   Tobacco Use    Smoking status: Former     Packs/day: .5     Types: Cigarettes    Smokeless tobacco: Never   Vaping Use    Vaping Use: Every day   Substance and Sexual Activity    Alcohol use: Not on file    Drug use: Not on file    Sexual activity: Not on file   Other Topics Concern    Not on file   Social History Narrative    Not on file     Social Determinants of Health     Financial Resource Strain: Not on file   Food Insecurity: Not on file   Transportation Needs: Not on file   Physical Activity: Not on file   Stress: Not on file   Social Connections: Not on file   Interpersonal Safety: Not on file   Housing Stability: Not on file       Current Meds:  Current Outpatient Medications   Medication Sig Dispense Refill    albuterol (PROAIR HFA/PROVENTIL HFA/VENTOLIN HFA) 108 (90 Base) MCG/ACT inhaler Inhale 2 puffs into the lungs every 6 hours as needed for shortness of breath, wheezing or cough 18 g 11    alum & mag hydroxide-simethicone (MAALOX) 200-200-20 MG/5ML SUSP suspension Take 15-30 mLs by mouth once as needed for other      busPIRone (BUSPAR) 7.5 MG tablet Take 1 tablet (7.5 mg) by mouth 2 times daily 60 tablet 11    famotidine (PEPCID) 20 MG tablet Take 20 mg by mouth      omeprazole (PRILOSEC) 40 MG DR capsule Take 1 capsule (40 mg) by mouth daily 90 capsule 0    hydrOXYzine  (VISTARIL) 50 MG capsule Take 2 capsules (100 mg) by mouth nightly as needed for itching or anxiety (Patient not taking: Reported on 1/19/2024) 60 capsule 2       Physical Exam:  /72 (BP Location: Right arm, Patient Position: Sitting, Cuff Size: Adult Regular)   Pulse 66   Wt 68.9 kg (151 lb 12.8 oz)   SpO2 100%   BMI 19.76 kg/m    Gen: awake, alert, oriented, no distress  HEENT: nasal turbinates are unremarkable, no oropharyngeal lesions, no cervical or supraclavicular lymphadenopathy  CV: RRR, no M/G/R  Resp: CTAB, no focal crackles or wheezes  Skin: no apparent rashes  Ext: no cyanosis, clubbing or edema  Neuro: alert, nonfocal    Labs:  Reviewed  CBC no eos in 2022      Imaging studies:  Personally reviewed    Narrative & Impression   EXAM: XR CHEST 2 VW  LOCATION: [facility]  DATE/TIME: 7/19/2022 7:54 PM     INDICATION: Shortness of breath.  COMPARISON: None.                                                                      IMPRESSION: Negative chest. Lungs clear.       CT chest:  No acute pathology (personally reviewed)    Pulmonary Function Testing  Dec 2023  FEV1 3.75L, 81%  %  Ratio 0.61  +BD respone, 15% improvement in FEV1  Mid flow rates reduced with +26% BD respone  TLC 8.57L, 112%  %  Dlco 104% mia for hgb

## 2024-01-19 NOTE — LETTER
1/19/2024         RE: Ren Prieto  [address] [facility] [place] [address]        Dear Colleague,    Thank you for referring your patient, Ren Prieto, to the [facility]. Please see a copy of my visit note below.    Pulmonary Clinic Outpatient Consultation    Assessment and Plan:   28M with a history of dysphagia, anxiety, presents for wheezing evaluation. He's had intermittent wheezing associated with occasional cough and shortness of breath for several months. The symptoms are relieved by albuterol use. PFTs showed borderline FEV1 with positive response to bronchodilator, reduction in mid flow rates with positive bronchodilator response, normal TLC and normal Dlco. These are consistent with small airways disease and probable asthma. His seems to be mild with only as needed albuterol use. Discussed pathophysiology of asthma and treatment options. He does not feel he needs a maintenance inhaler. I will prescribe him the albuterol to be uses as needed. Discussed importance of vaccinations.     Recommendations:  - albuterol inhaler with spacer, 1-2 puffs up to 4x per day as needed  - if he is using his rescue inhaler very frequently, advised him to let me know so we could discuss getting him on a maintenance inhaler  - flu shot today. Should get pneumococcal and covid-19 vaccination; defer to his PMD.    Offered scheduled follow up in 6-12 months, but he prefers to follow up with his PMD going forward. I encouraged him to call me with any questions or concerns.    Yaron Wells MD (Avi)  [facility] ([facility])  Clinic [phone]  Fax [phone]     CCx: dysphagia, wheezing    HPI: 28M with a history of dysphagia, anxiety, presents for wheezing evaluation. He was referred by [place] [facility]. He was noted to have wheezing during a recent GI office visit. He doesn't smoke, but he has been vaping for 4 year. He quit vaping for one month and did not  notice any change in his wheezing. He notes issues sleeping. He hs some dyspnea but nothing too limiting. He has no hemoptysis of sputum production. He uses his girlfriend's inhaler with good relief.   He notes that when he was a toddler he required inhalers and nebulized medications but had not needed to use them for many years.     ROS:  A 12-system review was obtained and was negative with the exception of the symptoms endorsed in the history of present illness.    PMH:  No past medical history on file.     PSH:  No past surgical history on file.    Allergies:  No Known Allergies    Family HX:  No family history on file.    Social Hx:  Social History     Socioeconomic History     Marital status: Single     Spouse name: Not on file     Number of children: Not on file     Years of education: Not on file     Highest education level: Not on file   Occupational History     Not on file   Tobacco Use     Smoking status: Former     Packs/day: .5     Types: Cigarettes     Smokeless tobacco: Never   Vaping Use     Vaping Use: Every day   Substance and Sexual Activity     Alcohol use: Not on file     Drug use: Not on file     Sexual activity: Not on file   Other Topics Concern     Not on file   Social History Narrative     Not on file     Social Determinants of Health     Financial Resource Strain: Not on file   Food Insecurity: Not on file   Transportation Needs: Not on file   Physical Activity: Not on file   Stress: Not on file   Social Connections: Not on file   Interpersonal Safety: Not on file   Housing Stability: Not on file       Current Meds:  Current Outpatient Medications   Medication Sig Dispense Refill     albuterol (PROAIR HFA/PROVENTIL HFA/VENTOLIN HFA) 108 (90 Base) MCG/ACT inhaler Inhale 2 puffs into the lungs every 6 hours as needed for shortness of breath, wheezing or cough 18 g 11     alum & mag hydroxide-simethicone (MAALOX) 200-200-20 MG/5ML SUSP suspension Take 15-30 mLs by mouth once as needed for  other       busPIRone (BUSPAR) 7.5 MG tablet Take 1 tablet (7.5 mg) by mouth 2 times daily 60 tablet 11     famotidine (PEPCID) 20 MG tablet Take 20 mg by mouth       omeprazole (PRILOSEC) 40 MG DR capsule Take 1 capsule (40 mg) by mouth daily 90 capsule 0     hydrOXYzine (VISTARIL) 50 MG capsule Take 2 capsules (100 mg) by mouth nightly as needed for itching or anxiety (Patient not taking: Reported on 1/19/2024) 60 capsule 2       Physical Exam:  /72 (BP Location: Right arm, Patient Position: Sitting, Cuff Size: Adult Regular)   Pulse 66   Wt 68.9 kg (151 lb 12.8 oz)   SpO2 100%   BMI 19.76 kg/m    Gen: awake, alert, oriented, no distress  HEENT: nasal turbinates are unremarkable, no oropharyngeal lesions, no cervical or supraclavicular lymphadenopathy  CV: RRR, no M/G/R  Resp: CTAB, no focal crackles or wheezes  Skin: no apparent rashes  Ext: no cyanosis, clubbing or edema  Neuro: alert, nonfocal    Labs:  Reviewed  CBC no eos in 2022      Imaging studies:  Personally reviewed    Narrative & Impression   EXAM: XR CHEST 2 VW  LOCATION: [facility]  DATE/TIME: 7/19/2022 7:54 PM     INDICATION: Shortness of breath.  COMPARISON: None.                                                                      IMPRESSION: Negative chest. Lungs clear.       CT chest:  No acute pathology (personally reviewed)    Pulmonary Function Testing  Dec 2023  FEV1 3.75L, 81%  %  Ratio 0.61  +BD respone, 15% improvement in FEV1  Mid flow rates reduced with +26% BD respone  TLC 8.57L, 112%  %  Dlco 104% mia for hgb    Again, thank you for allowing me to participate in the care of your patient.        Sincerely,        Yaron Wells MD

## 2024-02-21 ENCOUNTER — TRANSFERRED RECORDS (OUTPATIENT)
Dept: HEALTH INFORMATION MANAGEMENT | Facility: CLINIC | Age: 29
End: 2024-02-21
Payer: COMMERCIAL

## 2024-02-27 ENCOUNTER — OFFICE VISIT (OUTPATIENT)
Dept: FAMILY MEDICINE | Facility: CLINIC | Age: 29
End: 2024-02-27
Payer: COMMERCIAL

## 2024-02-27 VITALS
RESPIRATION RATE: 14 BRPM | HEIGHT: 74 IN | OXYGEN SATURATION: 97 % | DIASTOLIC BLOOD PRESSURE: 67 MMHG | TEMPERATURE: 97.6 F | WEIGHT: 167 LBS | SYSTOLIC BLOOD PRESSURE: 107 MMHG | BODY MASS INDEX: 21.43 KG/M2 | HEART RATE: 79 BPM

## 2024-02-27 DIAGNOSIS — Z87.898 HX OF SYNCOPE: ICD-10-CM

## 2024-02-27 DIAGNOSIS — K22.719 BARRETT'S ESOPHAGUS WITH DYSPLASIA: ICD-10-CM

## 2024-02-27 DIAGNOSIS — R06.83 SNORING: Primary | ICD-10-CM

## 2024-02-27 DIAGNOSIS — R06.81 WITNESSED APNEIC SPELLS: ICD-10-CM

## 2024-02-27 DIAGNOSIS — Z00.00 VISIT FOR PREVENTIVE HEALTH EXAMINATION: ICD-10-CM

## 2024-02-27 DIAGNOSIS — F41.9 ANXIETY: ICD-10-CM

## 2024-02-27 PROCEDURE — 99395 PREV VISIT EST AGE 18-39: CPT | Performed by: NURSE PRACTITIONER

## 2024-02-27 PROCEDURE — 99213 OFFICE O/P EST LOW 20 MIN: CPT | Mod: 25 | Performed by: NURSE PRACTITIONER

## 2024-02-27 ASSESSMENT — ANXIETY QUESTIONNAIRES
8. IF YOU CHECKED OFF ANY PROBLEMS, HOW DIFFICULT HAVE THESE MADE IT FOR YOU TO DO YOUR WORK, TAKE CARE OF THINGS AT HOME, OR GET ALONG WITH OTHER PEOPLE?: VERY DIFFICULT
GAD7 TOTAL SCORE: 6
2. NOT BEING ABLE TO STOP OR CONTROL WORRYING: NOT AT ALL
5. BEING SO RESTLESS THAT IT IS HARD TO SIT STILL: SEVERAL DAYS
7. FEELING AFRAID AS IF SOMETHING AWFUL MIGHT HAPPEN: NOT AT ALL
GAD7 TOTAL SCORE: 6
1. FEELING NERVOUS, ANXIOUS, OR ON EDGE: SEVERAL DAYS
3. WORRYING TOO MUCH ABOUT DIFFERENT THINGS: SEVERAL DAYS
7. FEELING AFRAID AS IF SOMETHING AWFUL MIGHT HAPPEN: NOT AT ALL
4. TROUBLE RELAXING: SEVERAL DAYS
6. BECOMING EASILY ANNOYED OR IRRITABLE: MORE THAN HALF THE DAYS
IF YOU CHECKED OFF ANY PROBLEMS ON THIS QUESTIONNAIRE, HOW DIFFICULT HAVE THESE PROBLEMS MADE IT FOR YOU TO DO YOUR WORK, TAKE CARE OF THINGS AT HOME, OR GET ALONG WITH OTHER PEOPLE: VERY DIFFICULT

## 2024-02-27 ASSESSMENT — ASTHMA QUESTIONNAIRES
QUESTION_2 LAST FOUR WEEKS HOW OFTEN HAVE YOU HAD SHORTNESS OF BREATH: ONCE OR TWICE A WEEK
ACT_TOTALSCORE: 19
QUESTION_3 LAST FOUR WEEKS HOW OFTEN DID YOUR ASTHMA SYMPTOMS (WHEEZING, COUGHING, SHORTNESS OF BREATH, CHEST TIGHTNESS OR PAIN) WAKE YOU UP AT NIGHT OR EARLIER THAN USUAL IN THE MORNING: ONCE OR TWICE
QUESTION_1 LAST FOUR WEEKS HOW MUCH OF THE TIME DID YOUR ASTHMA KEEP YOU FROM GETTING AS MUCH DONE AT WORK, SCHOOL OR AT HOME: A LITTLE OF THE TIME
ACT_TOTALSCORE: 19
QUESTION_4 LAST FOUR WEEKS HOW OFTEN HAVE YOU USED YOUR RESCUE INHALER OR NEBULIZER MEDICATION (SUCH AS ALBUTEROL): TWO OR THREE TIMES PER WEEK
QUESTION_5 LAST FOUR WEEKS HOW WOULD YOU RATE YOUR ASTHMA CONTROL: WELL CONTROLLED

## 2024-02-27 ASSESSMENT — PATIENT HEALTH QUESTIONNAIRE - PHQ9: SUM OF ALL RESPONSES TO PHQ QUESTIONS 1-9: 5

## 2024-02-27 NOTE — PROGRESS NOTES
Preventive Care Visit  [facility]  CHAPIN Riojas CNP, Family Medicine  Feb 27, 2024    Assessment & Plan     Snoring  *  - Adult Sleep Eval & Management  Referral    Witnessed apneic spells  *  - Adult Sleep Eval & Management  Referral    Penn's esophagus with dysplasia  *    Hx of syncope  *-Stable  Anxiety  *    Visit for preventive health examination  *    - overall doing well. Will continue to follow up with GI and PPI.   - vitamin D recommended. Eats a diet enriched in Calcium. Can take low dose Mag.   - sleep study ordered.       Michael Mcclure is a 28 year old, presenting for the following:  Consult (Sleep apnea, needs a sleep study) and Sleep Problem (Referral for sleep study, sleep apnea, been worsen for 1x year)    -His wife is present today.   -She is noticing periods of apnea while he sleeps.  She is also noticing loud snoring.  The patient is noticing gasping/choking for air at times while sleeping.  He also has been feeling tired during the day.  They would like to get assessed for sleep apnea.    -Was seen by GI, Penn's esophagitis noted.  He is currently taking omeprazole, and will continue this medication long-term.  He is aware of this condition and the importance of continuing his PPI.  He is feeling better.   -His anxiety has been stable on BuSpar.  He would like to continue this medication.  He is not noticing any side effects.         2/27/2024     2:24 PM   Additional Questions   Roomed by Julián   Accompanied by girlfriend        Health Care Directive  Patient does not have a Health Care Directive or Living Will: Discussed advance care planning with patient; however, patient declined at this time.         Today's PHQ-2 Score:       2/27/2024     2:18 PM   PHQ-2 ( 1999 [employer])   Q1: Little interest or pleasure in doing things 1   Q2: Feeling down, depressed or hopeless 0   PHQ-2 Score 1   Q1: Little interest or pleasure in doing  "things Several days   Q2: Feeling down, depressed or hopeless Not at all   PHQ-2 Score 1            No data to display              Social History     Tobacco Use    Smoking status: Former     Packs/day: .5     Types: Cigarettes     Quit date: 2019     Years since quittin.1    Smokeless tobacco: Never   Vaping Use    Vaping Use: Former    Substances: Nicotine    Devices: Pre-filled or refillable cartridge   Substance Use Topics    Alcohol use: Not Currently    Drug use: Never              No data to display                 Reviewed and updated as needed this visit by Provider                    No past medical history on file.  No past surgical history on file.  Labs reviewed in EPIC         Objective    Exam  /67   Pulse 79   Temp 97.6  F (36.4  C) (Oral)   Resp 14   Ht 1.867 m (6' 1.5\")   Wt 75.8 kg (167 lb)   SpO2 97%   BMI 21.73 kg/m     Estimated body mass index is 21.73 kg/m  as calculated from the following:    Height as of this encounter: 1.867 m (6' 1.5\").    Weight as of this encounter: 75.8 kg (167 lb).    @[facility]@  GENERAL: alert and no distress  EYES: Eyes grossly normal to inspection, PERRL and conjunctivae and sclerae normal  NECK: no adenopathy, no asymmetry, masses, or scars  RESP: lungs clear to auscultation - no rales, rhonchi or wheezes  CV: regular rate and rhythm, normal S1 S2, no S3 or S4, no murmur, click or rub, no peripheral edema  ABDOMEN: soft, nontender, no hepatosplenomegaly, no masses and bowel sounds normal  MS: no gross musculoskeletal defects noted, no edema  SKIN: no suspicious lesions or rashes  NEURO: Normal strength and tone, mentation intact and speech normal  PSYCH: mentation appears normal, affect normal/bright      Signed Electronically by: CHAPIN Riojas CNP    Answers submitted by the patient for this visit:  ANGELICA-7 (Submitted on 2024)  ANGELICA 7 TOTAL SCORE: 6  General Questionnaire (Submitted on 2024)  Chief Complaint: Chronic " problems general questions HPI Form  What is the reason for your visit today? : sleep apnea  How many servings of fruits and vegetables do you eat daily?: 0-1  On average, how many sweetened beverages do you drink each day (Examples: soda, juice, sweet tea, etc.  Do NOT count diet or artificially sweetened beverages)?: 2  How many minutes a day do you exercise enough to make your heart beat faster?: 10 to 19  How many days a week do you exercise enough to make your heart beat faster?: 3 or less  How many days per week do you miss taking your medication?: 2

## 2024-03-04 ENCOUNTER — TRANSFERRED RECORDS (OUTPATIENT)
Dept: HEALTH INFORMATION MANAGEMENT | Facility: CLINIC | Age: 29
End: 2024-03-04
Payer: COMMERCIAL

## 2024-04-05 ENCOUNTER — OFFICE VISIT (OUTPATIENT)
Dept: FAMILY MEDICINE | Facility: CLINIC | Age: 29
End: 2024-04-05
Payer: COMMERCIAL

## 2024-04-05 VITALS
WEIGHT: 166 LBS | SYSTOLIC BLOOD PRESSURE: 107 MMHG | RESPIRATION RATE: 12 BRPM | BODY MASS INDEX: 21.3 KG/M2 | TEMPERATURE: 97.3 F | DIASTOLIC BLOOD PRESSURE: 64 MMHG | HEART RATE: 57 BPM | HEIGHT: 74 IN | OXYGEN SATURATION: 100 %

## 2024-04-05 DIAGNOSIS — R74.8 ELEVATED CK: ICD-10-CM

## 2024-04-05 DIAGNOSIS — K22.719 BARRETT'S ESOPHAGUS WITH DYSPLASIA: ICD-10-CM

## 2024-04-05 DIAGNOSIS — N17.9 AKI (ACUTE KIDNEY INJURY) (H): ICD-10-CM

## 2024-04-05 DIAGNOSIS — G40.909 SEIZURE DISORDER (H): ICD-10-CM

## 2024-04-05 DIAGNOSIS — Z09 HOSPITAL DISCHARGE FOLLOW-UP: Primary | ICD-10-CM

## 2024-04-05 PROBLEM — K22.70 BARRETT ESOPHAGUS: Status: ACTIVE | Noted: 2024-03-24

## 2024-04-05 PROBLEM — J69.0 ASPIRATION PNEUMONITIS (H): Status: ACTIVE | Noted: 2024-03-26

## 2024-04-05 PROBLEM — R78.81 BACTEREMIA: Status: ACTIVE | Noted: 2024-03-25

## 2024-04-05 PROBLEM — G40.901 STATUS EPILEPTICUS (H): Status: ACTIVE | Noted: 2024-03-24

## 2024-04-05 PROBLEM — F17.210 CIGARETTE NICOTINE DEPENDENCE WITHOUT COMPLICATION: Status: ACTIVE | Noted: 2024-03-25

## 2024-04-05 PROCEDURE — 82550 ASSAY OF CK (CPK): CPT | Performed by: NURSE PRACTITIONER

## 2024-04-05 PROCEDURE — 99214 OFFICE O/P EST MOD 30 MIN: CPT | Performed by: NURSE PRACTITIONER

## 2024-04-05 PROCEDURE — 36415 COLL VENOUS BLD VENIPUNCTURE: CPT | Performed by: NURSE PRACTITIONER

## 2024-04-05 PROCEDURE — 80048 BASIC METABOLIC PNL TOTAL CA: CPT | Performed by: NURSE PRACTITIONER

## 2024-04-05 RX ORDER — LEVETIRACETAM 750 MG/1
750 TABLET ORAL 2 TIMES DAILY
COMMUNITY
Start: 2024-03-27

## 2024-04-05 ASSESSMENT — ASTHMA QUESTIONNAIRES
QUESTION_5 LAST FOUR WEEKS HOW WOULD YOU RATE YOUR ASTHMA CONTROL: WELL CONTROLLED
ACT_TOTALSCORE: 20
QUESTION_1 LAST FOUR WEEKS HOW MUCH OF THE TIME DID YOUR ASTHMA KEEP YOU FROM GETTING AS MUCH DONE AT WORK, SCHOOL OR AT HOME: A LITTLE OF THE TIME
ACT_TOTALSCORE: 20
QUESTION_3 LAST FOUR WEEKS HOW OFTEN DID YOUR ASTHMA SYMPTOMS (WHEEZING, COUGHING, SHORTNESS OF BREATH, CHEST TIGHTNESS OR PAIN) WAKE YOU UP AT NIGHT OR EARLIER THAN USUAL IN THE MORNING: ONCE OR TWICE
QUESTION_2 LAST FOUR WEEKS HOW OFTEN HAVE YOU HAD SHORTNESS OF BREATH: ONCE OR TWICE A WEEK
QUESTION_4 LAST FOUR WEEKS HOW OFTEN HAVE YOU USED YOUR RESCUE INHALER OR NEBULIZER MEDICATION (SUCH AS ALBUTEROL): ONCE A WEEK OR LESS

## 2024-04-05 NOTE — PROGRESS NOTES
"  Assessment & Plan     Seizure disorder (H)  *  - levETIRAcetam (KEPPRA) 750 MG tablet    Hospital discharge follow-up  *  - Basic metabolic panel  - CK total  - Basic metabolic panel  - CK total    BHAVNA (acute kidney injury) (H24)  *  - Basic metabolic panel  - Basic metabolic panel    Penn's esophagus with dysplasia  *    Elevated CK  *  - CK total  - CK total    - I reviewed Hospital notes and labs. Plan to repeat BMP and CK. He will see Neurology at , I offered [facility] and he declined for now. No new symptoms. I went over medications and he is taking as directed.           MED REC REQUIRED  Post Medication Reconciliation Status: discharge medications reconciled, continue medications without change        Subjective   Ren is a 28 year old, presenting for the following health issues:  Hospital F/U (03/24/2024 epilepticus )      4/5/2024    12:53 PM   Additional Questions   Roomed by Julián SAUNDERS     Present with his GF.   No new sx since hospital visit. He has been taking his medications as directed. Did have some GI upset with Augmentin, but feels better now off the med. He has been eating and drinking water okay. He has plans to follow up with dietary and neurology at .   Has been off work. 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   Taking 20 mg PPI for Barretts. He is aware it is a lifelong med.             Review of Systems  Constitutional, HEENT, cardiovascular, pulmonary, gi and gu systems are negative, except as otherwise noted.      Objective    /64   Pulse 57   Temp 97.3  F (36.3  C) (Oral)   Resp 12   Ht 1.867 m (6' 1.5\")   Wt 75.3 kg (166 lb)   SpO2 100%   BMI 21.60 kg/m    Body mass index is 21.6 kg/m .  Physical Exam   GENERAL: alert and no distress  NECK: no adenopathy, no asymmetry, masses, or scars  RESP: lungs clear to auscultation - no rales, rhonchi or wheezes  CV: regular rate and rhythm, normal S1 S2, no S3 or " S4, no murmur, click or rub, no peripheral edema  ABDOMEN: soft, nontender, no hepatosplenomegaly, no masses and bowel sounds normal  MS: no gross musculoskeletal defects noted, no edema  NEURO: Normal strength and tone, mentation intact and speech normal    Office Visit on 12/21/2023   Component Date Value Ref Range Status    FVC-Pred 12/21/2023 5.53  L Final    FVC-Pre 12/21/2023 6.15  L Final    FVC-%Pred-Pre 12/21/2023 111  % Final    FEV1-Pre 12/21/2023 3.75  L Final    FEV1-%Pred-Pre 12/21/2023 81  % Final    FEV1FVC-Pred 12/21/2023 84  % Final    FEV1FVC-Pre 12/21/2023 61  % Final    FEFMax-Pred 12/21/2023 10.79  L/sec Final    FEFMax-Pre 12/21/2023 6.33  L/sec Final    FEFMax-%Pred-Pre 12/21/2023 58  % Final    FEF2575-Pred 12/21/2023 4.80  L/sec Final    FEF2575-Pre 12/21/2023 2.53  L/sec Final    TUD6531-%Pred-Pre 12/21/2023 52  % Final    FEF2575-Post 12/21/2023 3.20  L/sec Final    AUZ1723-%Pred-Post 12/21/2023 66  % Final    ExpTime-Pre 12/21/2023 4.52  sec Final    FIFMax-Pre 12/21/2023 5.97  L/sec Final    VC-Pred 12/21/2023 5.69  L Final    VC-Pre 12/21/2023 5.69  L Final    VC-%Pred-Pre 12/21/2023 99  % Final    IC-Pred 12/21/2023 3.93  L Final    IC-Pre 12/21/2023 4.04  L Final    IC-%Pred-Pre 12/21/2023 102  % Final    ERV-Pred 12/21/2023 1.97  L Final    ERV-Pre 12/21/2023 1.65  L Final    ERV-%Pred-Pre 12/21/2023 83  % Final    FEV1FEV6-Pred 12/21/2023 84  % Final    FEV1FEV6-Pre 12/21/2023 61  % Final    FRCPleth-Pred 12/21/2023 3.46  L Final    FRCPleth-Pre 12/21/2023 4.52  L Final    FRCPleth-%Pred-Pre 12/21/2023 130  % Final    RVPleth-Pred 12/21/2023 1.76  L Final    RVPleth-Pre 12/21/2023 2.88  L Final    RVPleth-%Pred-Pre 12/21/2023 163  % Final    TLCPleth-Pred 12/21/2023 7.63  L Final    TLCPleth-Pre 12/21/2023 8.57  L Final    TLCPleth-%Pred-Pre 12/21/2023 112  % Final    DLCOunc-Pred 12/21/2023 35.78  ml/min/mmHg Final    DLCOunc-Pre 12/21/2023 38.06  ml/min/mmHg Final     DLCOunc-%Pred-Pre 12/21/2023 106  % Final    DLCOcor-Pre 12/21/2023 37.44  ml/min/mmHg Final    DLCOcor-%Pred-Pre 12/21/2023 104  % Final    VA-Pre 12/21/2023 7.95  L Final    VA-%Pred-Pre 12/21/2023 111  % Final    FEV1SVC-Pred 12/21/2023 81  % Final    FEV1SVC-Pre 12/21/2023 66  % Final    Hemoglobin POCT 12/21/2023 15.2  g/dL Final           Signed Electronically by: CHAPIN Riojas CNP

## 2024-04-06 LAB
ANION GAP SERPL CALCULATED.3IONS-SCNC: 7 MMOL/L (ref 7–15)
BUN SERPL-MCNC: 15.9 MG/DL (ref 6–20)
CALCIUM SERPL-MCNC: 9.2 MG/DL (ref 8.6–10)
CHLORIDE SERPL-SCNC: 104 MMOL/L (ref 98–107)
CK SERPL-CCNC: 111 U/L (ref 39–308)
CREAT SERPL-MCNC: 1.2 MG/DL (ref 0.67–1.17)
DEPRECATED HCO3 PLAS-SCNC: 28 MMOL/L (ref 22–29)
EGFRCR SERPLBLD CKD-EPI 2021: 86 ML/MIN/1.73M2
GLUCOSE SERPL-MCNC: 83 MG/DL (ref 70–99)
POTASSIUM SERPL-SCNC: 4.7 MMOL/L (ref 3.4–5.3)
SODIUM SERPL-SCNC: 139 MMOL/L (ref 135–145)

## 2024-04-29 ENCOUNTER — MYC MEDICAL ADVICE (OUTPATIENT)
Dept: FAMILY MEDICINE | Facility: CLINIC | Age: 29
End: 2024-04-29
Payer: COMMERCIAL

## 2024-04-30 NOTE — TELEPHONE ENCOUNTER
4-30-24    Forms/Letter Request    Type of form/letter:  [employer] Loss of Consciousness form    Do we have the form/letter: Yes: in [place] folder    When is form/letter needed by: no due date on form    How would you like the form/letter returned:

## 2024-05-07 NOTE — TELEPHONE ENCOUNTER
I completed forms and put in file/outbox so he can . When is his neurology appt? Please address driving with them as well.     CHAPIN Riojas CNP

## 2024-05-07 NOTE — TELEPHONE ENCOUNTER
5-7-24  Called pt stated his form is now at , I made copy and send to scanning to scan in chart   Per pt his neurology appt is in July  Iván

## 2024-06-03 RX ORDER — DIVALPROEX SODIUM 250 MG/1
750 TABLET, DELAYED RELEASE ORAL
COMMUNITY
Start: 2024-04-24 | End: 2025-04-24

## 2024-06-03 RX ORDER — LEVETIRACETAM 1000 MG/1
TABLET ORAL
COMMUNITY
Start: 2024-04-23

## 2024-06-03 RX ORDER — MIDAZOLAM 5 MG/.1ML
SPRAY NASAL
COMMUNITY
Start: 2024-05-01

## 2024-06-03 RX ORDER — ONDANSETRON 4 MG/1
4 TABLET, ORALLY DISINTEGRATING ORAL
COMMUNITY
Start: 2024-04-24

## 2024-06-05 ENCOUNTER — NURSE TRIAGE (OUTPATIENT)
Dept: FAMILY MEDICINE | Facility: CLINIC | Age: 29
End: 2024-06-05
Payer: COMMERCIAL

## 2024-06-05 NOTE — TELEPHONE ENCOUNTER
Patient called with his girlfriend (Hoa) over the phone. Patient had a seizure this morning, he is currently taking 750mg of Depakote BID. Curious about an increase of the medication. Last seizure was 5/25/24 having two in one day that is when he was changed from Keppra to Depakote. Patient is stable with no red flag symptoms at this time.    Reason for Disposition   Seizure lasting < 5 minutes with a history of prior seizure(s), and taking anticonvulsants    Additional Information   Negative: First seizure ever   Negative: SEVERE headache  (Note: Most people have a headache after a seizure.)   Negative: Second seizure occurs on the same day   Negative: Fever > 100.4 F (38.0 C)   Negative: Substance use (drug use) or unhealthy alcohol use, known suspected   Negative: Not on or ran out of seizure medicine (anticonvulsant)   Negative: Stopped taking seizure medicine (anticonvulsant)   Negative: Epileptic seizures occur frequently (several per week)   Negative: Seizure lasting of unknown duration with a history of prior seizure(s), and taking anticonvulsants   Negative: Patient wants to be seen    Protocols used: [id number]-VY

## 2024-06-06 NOTE — TELEPHONE ENCOUNTER
RN LVM for patient to call clinic back. Please relay provider recommendation to reach out to Neurologist for seizure medication management.    Last Neuro entry in chart 5/24/2024  Neurology at [facility] [address]   [place], [place] [id number] [phone]  Shasha Wesley MD   [address] [place], [place] [id number] [phone] (Work)   [phone] (Fax)        JJ Aldana  [facility]  [phone]    [facility]   Monday  - Thursday 7 AM - 6 PM    Friday  7 AM - 5 PM     -Please call your clinic for assistance from a nurse after hours.

## 2024-06-06 NOTE — TELEPHONE ENCOUNTER
Concern about having had a seizure and potential adjustment to medication, this really needs to go to the patient's neurologist.

## 2024-07-03 ENCOUNTER — MYC MEDICAL ADVICE (OUTPATIENT)
Dept: FAMILY MEDICINE | Facility: CLINIC | Age: 29
End: 2024-07-03
Payer: COMMERCIAL

## 2024-07-03 NOTE — LETTER
July 12, 2024      Renhon JAX Prieto  [address] [facility] [facility] [place] [address]        To Whom It May Concern:    Ren Prieto was seen in our clinic. He may return to work with the following:     Humble has seizure on June 5th, and this will reset his 3 month restrictions. The limitations are the same as the first letter(not being able to lift more than 10 lbs, not being able to drive, no sharp objects or activities that may harm myself or others).    Sincerely,    CHAPIN Riojas CNP

## 2024-11-06 ASSESSMENT — ANXIETY QUESTIONNAIRES: GAD7 TOTAL SCORE: 11

## 2025-01-28 ENCOUNTER — PATIENT OUTREACH (OUTPATIENT)
Dept: CARE COORDINATION | Facility: CLINIC | Age: 30
End: 2025-01-28
Payer: COMMERCIAL

## 2025-02-11 ENCOUNTER — PATIENT OUTREACH (OUTPATIENT)
Dept: CARE COORDINATION | Facility: CLINIC | Age: 30
End: 2025-02-11
Payer: COMMERCIAL

## 2025-04-06 ENCOUNTER — HEALTH MAINTENANCE LETTER (OUTPATIENT)
Age: 30
End: 2025-04-06

## 2025-05-12 NOTE — TELEPHONE ENCOUNTER
Left voicemail for patient to return call to clinic. When patient returns call, please give them below message.    Will call back later, to remind the patient about the form to  at the  and to find out when they expect to stop in and collect them.    Julián COOPER    see chief complaint quote